# Patient Record
Sex: MALE | Race: WHITE | NOT HISPANIC OR LATINO | URBAN - METROPOLITAN AREA
[De-identification: names, ages, dates, MRNs, and addresses within clinical notes are randomized per-mention and may not be internally consistent; named-entity substitution may affect disease eponyms.]

---

## 2018-12-14 ENCOUNTER — EMERGENCY (EMERGENCY)
Facility: HOSPITAL | Age: 38
LOS: 1 days | Discharge: HOME | End: 2018-12-14
Attending: EMERGENCY MEDICINE | Admitting: EMERGENCY MEDICINE
Payer: OTHER MISCELLANEOUS

## 2018-12-14 VITALS
OXYGEN SATURATION: 100 % | HEART RATE: 79 BPM | DIASTOLIC BLOOD PRESSURE: 85 MMHG | TEMPERATURE: 98 F | RESPIRATION RATE: 18 BRPM | SYSTOLIC BLOOD PRESSURE: 149 MMHG

## 2018-12-14 LAB
ALBUMIN SERPL ELPH-MCNC: 4.4 G/DL — SIGNIFICANT CHANGE UP (ref 3.3–5)
ALP SERPL-CCNC: 54 U/L — SIGNIFICANT CHANGE UP (ref 40–120)
ALT FLD-CCNC: 34 U/L — SIGNIFICANT CHANGE UP (ref 4–41)
APPEARANCE UR: CLEAR — SIGNIFICANT CHANGE UP
APTT BLD: 31.9 SEC — SIGNIFICANT CHANGE UP (ref 27.5–36.3)
AST SERPL-CCNC: 30 U/L — SIGNIFICANT CHANGE UP (ref 4–40)
BASOPHILS # BLD AUTO: 0.03 K/UL — SIGNIFICANT CHANGE UP (ref 0–0.2)
BASOPHILS NFR BLD AUTO: 0.6 % — SIGNIFICANT CHANGE UP (ref 0–2)
BILIRUB SERPL-MCNC: 0.3 MG/DL — SIGNIFICANT CHANGE UP (ref 0.2–1.2)
BILIRUB UR-MCNC: NEGATIVE — SIGNIFICANT CHANGE UP
BLD GP AB SCN SERPL QL: NEGATIVE — SIGNIFICANT CHANGE UP
BLOOD UR QL VISUAL: NEGATIVE — SIGNIFICANT CHANGE UP
BUN SERPL-MCNC: 13 MG/DL — SIGNIFICANT CHANGE UP (ref 7–23)
CALCIUM SERPL-MCNC: 9.4 MG/DL — SIGNIFICANT CHANGE UP (ref 8.4–10.5)
CHLORIDE SERPL-SCNC: 100 MMOL/L — SIGNIFICANT CHANGE UP (ref 98–107)
CO2 SERPL-SCNC: 25 MMOL/L — SIGNIFICANT CHANGE UP (ref 22–31)
COLOR SPEC: SIGNIFICANT CHANGE UP
CREAT SERPL-MCNC: 0.85 MG/DL — SIGNIFICANT CHANGE UP (ref 0.5–1.3)
EOSINOPHIL # BLD AUTO: 0.16 K/UL — SIGNIFICANT CHANGE UP (ref 0–0.5)
EOSINOPHIL NFR BLD AUTO: 3.1 % — SIGNIFICANT CHANGE UP (ref 0–6)
GLUCOSE SERPL-MCNC: 134 MG/DL — HIGH (ref 70–99)
GLUCOSE UR-MCNC: NEGATIVE — SIGNIFICANT CHANGE UP
HBA1C BLD-MCNC: 5.6 % — SIGNIFICANT CHANGE UP (ref 4–5.6)
HCT VFR BLD CALC: 43.6 % — SIGNIFICANT CHANGE UP (ref 39–50)
HGB BLD-MCNC: 14.6 G/DL — SIGNIFICANT CHANGE UP (ref 13–17)
IMM GRANULOCYTES # BLD AUTO: 0.02 # — SIGNIFICANT CHANGE UP
IMM GRANULOCYTES NFR BLD AUTO: 0.4 % — SIGNIFICANT CHANGE UP (ref 0–1.5)
INR BLD: 0.82 — LOW (ref 0.88–1.17)
KETONES UR-MCNC: NEGATIVE — SIGNIFICANT CHANGE UP
LEUKOCYTE ESTERASE UR-ACNC: NEGATIVE — SIGNIFICANT CHANGE UP
LYMPHOCYTES # BLD AUTO: 1.4 K/UL — SIGNIFICANT CHANGE UP (ref 1–3.3)
LYMPHOCYTES # BLD AUTO: 26.9 % — SIGNIFICANT CHANGE UP (ref 13–44)
MCHC RBC-ENTMCNC: 29.6 PG — SIGNIFICANT CHANGE UP (ref 27–34)
MCHC RBC-ENTMCNC: 33.5 % — SIGNIFICANT CHANGE UP (ref 32–36)
MCV RBC AUTO: 88.3 FL — SIGNIFICANT CHANGE UP (ref 80–100)
MONOCYTES # BLD AUTO: 0.54 K/UL — SIGNIFICANT CHANGE UP (ref 0–0.9)
MONOCYTES NFR BLD AUTO: 10.4 % — SIGNIFICANT CHANGE UP (ref 2–14)
NEUTROPHILS # BLD AUTO: 3.06 K/UL — SIGNIFICANT CHANGE UP (ref 1.8–7.4)
NEUTROPHILS NFR BLD AUTO: 58.6 % — SIGNIFICANT CHANGE UP (ref 43–77)
NITRITE UR-MCNC: NEGATIVE — SIGNIFICANT CHANGE UP
NRBC # FLD: 0 — SIGNIFICANT CHANGE UP
PH UR: 6 — SIGNIFICANT CHANGE UP (ref 5–8)
PLATELET # BLD AUTO: 261 K/UL — SIGNIFICANT CHANGE UP (ref 150–400)
PMV BLD: 9.5 FL — SIGNIFICANT CHANGE UP (ref 7–13)
POTASSIUM SERPL-MCNC: 4.3 MMOL/L — SIGNIFICANT CHANGE UP (ref 3.5–5.3)
POTASSIUM SERPL-SCNC: 4.3 MMOL/L — SIGNIFICANT CHANGE UP (ref 3.5–5.3)
PROT SERPL-MCNC: 7.5 G/DL — SIGNIFICANT CHANGE UP (ref 6–8.3)
PROT UR-MCNC: NEGATIVE — SIGNIFICANT CHANGE UP
PROTHROM AB SERPL-ACNC: 9.3 SEC — LOW (ref 9.8–13.1)
RBC # BLD: 4.94 M/UL — SIGNIFICANT CHANGE UP (ref 4.2–5.8)
RBC # FLD: 12.4 % — SIGNIFICANT CHANGE UP (ref 10.3–14.5)
RH IG SCN BLD-IMP: POSITIVE — SIGNIFICANT CHANGE UP
SODIUM SERPL-SCNC: 139 MMOL/L — SIGNIFICANT CHANGE UP (ref 135–145)
SP GR SPEC: 1.03 — SIGNIFICANT CHANGE UP (ref 1–1.04)
UROBILINOGEN FLD QL: NORMAL — SIGNIFICANT CHANGE UP
WBC # BLD: 5.21 K/UL — SIGNIFICANT CHANGE UP (ref 3.8–10.5)
WBC # FLD AUTO: 5.21 K/UL — SIGNIFICANT CHANGE UP (ref 3.8–10.5)

## 2018-12-14 PROCEDURE — 74177 CT ABD & PELVIS W/CONTRAST: CPT | Mod: 26

## 2018-12-14 PROCEDURE — 76870 US EXAM SCROTUM: CPT | Mod: 26

## 2018-12-14 PROCEDURE — 99218: CPT

## 2018-12-14 RX ORDER — HYDROMORPHONE HYDROCHLORIDE 2 MG/ML
1 INJECTION INTRAMUSCULAR; INTRAVENOUS; SUBCUTANEOUS ONCE
Qty: 0 | Refills: 0 | Status: DISCONTINUED | OUTPATIENT
Start: 2018-12-14 | End: 2018-12-14

## 2018-12-14 RX ORDER — FENTANYL CITRATE 50 UG/ML
50 INJECTION INTRAVENOUS ONCE
Qty: 0 | Refills: 0 | Status: DISCONTINUED | OUTPATIENT
Start: 2018-12-14 | End: 2018-12-14

## 2018-12-14 RX ORDER — SODIUM CHLORIDE 9 MG/ML
1000 INJECTION INTRAMUSCULAR; INTRAVENOUS; SUBCUTANEOUS ONCE
Qty: 0 | Refills: 0 | Status: COMPLETED | OUTPATIENT
Start: 2018-12-14 | End: 2018-12-14

## 2018-12-14 RX ORDER — SODIUM CHLORIDE 9 MG/ML
1000 INJECTION INTRAMUSCULAR; INTRAVENOUS; SUBCUTANEOUS
Qty: 0 | Refills: 0 | Status: DISCONTINUED | OUTPATIENT
Start: 2018-12-14 | End: 2018-12-18

## 2018-12-14 RX ORDER — ONDANSETRON 8 MG/1
4 TABLET, FILM COATED ORAL ONCE
Qty: 0 | Refills: 0 | Status: COMPLETED | OUTPATIENT
Start: 2018-12-14 | End: 2018-12-14

## 2018-12-14 RX ORDER — MORPHINE SULFATE 50 MG/1
4 CAPSULE, EXTENDED RELEASE ORAL ONCE
Qty: 0 | Refills: 0 | Status: DISCONTINUED | OUTPATIENT
Start: 2018-12-14 | End: 2018-12-14

## 2018-12-14 RX ORDER — DIPHENHYDRAMINE HCL 50 MG
25 CAPSULE ORAL ONCE
Qty: 0 | Refills: 0 | Status: COMPLETED | OUTPATIENT
Start: 2018-12-14 | End: 2018-12-14

## 2018-12-14 RX ADMIN — Medication 25 MILLIGRAM(S): at 15:29

## 2018-12-14 RX ADMIN — FENTANYL CITRATE 50 MICROGRAM(S): 50 INJECTION INTRAVENOUS at 12:00

## 2018-12-14 RX ADMIN — SODIUM CHLORIDE 1000 MILLILITER(S): 9 INJECTION INTRAMUSCULAR; INTRAVENOUS; SUBCUTANEOUS at 17:00

## 2018-12-14 RX ADMIN — FENTANYL CITRATE 50 MICROGRAM(S): 50 INJECTION INTRAVENOUS at 18:34

## 2018-12-14 RX ADMIN — MORPHINE SULFATE 4 MILLIGRAM(S): 50 CAPSULE, EXTENDED RELEASE ORAL at 10:10

## 2018-12-14 RX ADMIN — FENTANYL CITRATE 50 MICROGRAM(S): 50 INJECTION INTRAVENOUS at 18:16

## 2018-12-14 RX ADMIN — HYDROMORPHONE HYDROCHLORIDE 1 MILLIGRAM(S): 2 INJECTION INTRAMUSCULAR; INTRAVENOUS; SUBCUTANEOUS at 22:55

## 2018-12-14 RX ADMIN — MORPHINE SULFATE 4 MILLIGRAM(S): 50 CAPSULE, EXTENDED RELEASE ORAL at 09:38

## 2018-12-14 RX ADMIN — MORPHINE SULFATE 4 MILLIGRAM(S): 50 CAPSULE, EXTENDED RELEASE ORAL at 13:15

## 2018-12-14 RX ADMIN — MORPHINE SULFATE 4 MILLIGRAM(S): 50 CAPSULE, EXTENDED RELEASE ORAL at 10:06

## 2018-12-14 RX ADMIN — ONDANSETRON 4 MILLIGRAM(S): 8 TABLET, FILM COATED ORAL at 09:39

## 2018-12-14 RX ADMIN — MORPHINE SULFATE 4 MILLIGRAM(S): 50 CAPSULE, EXTENDED RELEASE ORAL at 13:00

## 2018-12-14 RX ADMIN — SODIUM CHLORIDE 1000 MILLILITER(S): 9 INJECTION INTRAMUSCULAR; INTRAVENOUS; SUBCUTANEOUS at 16:06

## 2018-12-14 RX ADMIN — HYDROMORPHONE HYDROCHLORIDE 1 MILLIGRAM(S): 2 INJECTION INTRAMUSCULAR; INTRAVENOUS; SUBCUTANEOUS at 22:19

## 2018-12-14 RX ADMIN — SODIUM CHLORIDE 3000 MILLILITER(S): 9 INJECTION INTRAMUSCULAR; INTRAVENOUS; SUBCUTANEOUS at 14:04

## 2018-12-14 RX ADMIN — SODIUM CHLORIDE 1000 MILLILITER(S): 9 INJECTION INTRAMUSCULAR; INTRAVENOUS; SUBCUTANEOUS at 17:04

## 2018-12-14 RX ADMIN — FENTANYL CITRATE 50 MICROGRAM(S): 50 INJECTION INTRAVENOUS at 14:59

## 2018-12-14 RX ADMIN — SODIUM CHLORIDE 1000 MILLILITER(S): 9 INJECTION INTRAMUSCULAR; INTRAVENOUS; SUBCUTANEOUS at 20:05

## 2018-12-14 RX ADMIN — SODIUM CHLORIDE 100 MILLILITER(S): 9 INJECTION INTRAMUSCULAR; INTRAVENOUS; SUBCUTANEOUS at 22:21

## 2018-12-14 RX ADMIN — MORPHINE SULFATE 4 MILLIGRAM(S): 50 CAPSULE, EXTENDED RELEASE ORAL at 11:45

## 2018-12-14 RX ADMIN — FENTANYL CITRATE 50 MICROGRAM(S): 50 INJECTION INTRAVENOUS at 11:45

## 2018-12-14 RX ADMIN — SODIUM CHLORIDE 1000 MILLILITER(S): 9 INJECTION INTRAMUSCULAR; INTRAVENOUS; SUBCUTANEOUS at 11:45

## 2018-12-14 RX ADMIN — SODIUM CHLORIDE 1000 MILLILITER(S): 9 INJECTION INTRAMUSCULAR; INTRAVENOUS; SUBCUTANEOUS at 09:39

## 2018-12-14 RX ADMIN — SODIUM CHLORIDE 1000 MILLILITER(S): 9 INJECTION INTRAMUSCULAR; INTRAVENOUS; SUBCUTANEOUS at 18:34

## 2018-12-14 NOTE — ED CDU PROVIDER INITIAL DAY NOTE - ATTENDING CONTRIBUTION TO CARE
Carly: 37 yo male with no significant medical history s/p mechanical fall through a floor an straddle injury on a beam below. No head trauma or LOC. + lower abdominal pain and nausea but no vomiting.  No chest pain or SOB. Pt endorses difficulty urinating despite having the urge. No flank pain. NO neck or back pain. No LE weakness or paresthesias. NO chronic anticoagulation use. Exam: GENERAL: well appearing, NAD, HEENT: MMM, PERRLA, CARDIO: +S1/S2, no murmurs, rubs or gallops, LUNGS: CTA B/L, no wheezing, rales or rhonchi, ABD: soft, mild suprapubic TTP, BSx4 quadrants, no guarding or rigidity. : exam chaperoned by DR Feliciano- extensive b/l scrotal edema and ecchymosis with TTP, EXT: No LE edema 5/5 strength x 4 extremities MSK: no midline spinal TTP NEURO: AxOx3, SKIN: no rashes or lesions, well perfused A/P - 37 yo male s/p isolated straddle injury. CTAP showed possible sacroiliitis and evidence of Crohn's- results discussed with patient. scrotal US showed possible decreased flow to left testicle- urology disagreed. Pt being followed by urology, was able to spontaneously void and CDU plan for monitoring of post void residual urine and pain control.

## 2018-12-14 NOTE — ED ADULT NURSE NOTE - NSIMPLEMENTINTERV_GEN_ALL_ED
Implemented All Universal Safety Interventions:  [place] to call system. Call bell, personal items and telephone within reach. Instruct patient to call for assistance. Room bathroom lighting operational. Non-slip footwear when patient is off stretcher. Physically safe environment: no spills, clutter or unnecessary equipment. Stretcher in lowest position, wheels locked, appropriate side rails in place.

## 2018-12-14 NOTE — ED PROVIDER NOTE - SHIFT CHANGE DETAILS
I have signed over this patient to the above attending physician. Pertinent history, physical exam findings and workup thus far in the ED have been discussed. The pending tests and plan, including urology recommendations were signed over.  All questions from the above attending physician have been answered.

## 2018-12-14 NOTE — ED ADULT NURSE NOTE - OBJECTIVE STATEMENT
Pt received in rm 8, AOx3, ambulatory, c/o of a fall 8 feet from 1st floor to basement, landing on groin area Pt received in rm 8, AOx3, ambulatory, c/o of a fall 8 feet from 1st floor to basement this morning at work, landing on groin area. Pt denies any LOC, head, neck, back pain, N/V or abd pain. Pt denies being on any anticoagulations. Pmhx HLD. Pt reports throbbing 9/10 pain and describes left testicle as feeling different than baseline. MD at bedside for eval, scrotum area tender on palpation with areas of ecchymosis. Pt reports not being able to urinate since fall. IV access obtained 20G Lft hand, labs sent, patient medicated for pain as ordered. Pt waiting on ultrasound, CT and urology consult, will continue to monitor. Pt received in rm 8, AOx3, ambulatory, c/o of a fall 8 feet from 1st floor to basement this morning at work, landing on groin area. Pt denies any LOC, head, neck, back pain, nausea or vomiting. Pt denies being on any anticoagulations. Pmhx HLD. Pt reports throbbing 9/10 groin and abd pain and describes left testicle as feeling different than baseline. MD at bedside for eval, scrotum area tender on palpation with areas of ecchymosis. Pt reports not being able to urinate since fall. IV access obtained 20G Lft hand, labs sent, patient medicated for pain as ordered. Pt waiting on ultrasound, CT and urology consult, will continue to monitor. Pt received in rm 8, AOx3, ambulatory, c/o of testicular pain after a 8 feet fall from 1st floor to basement this morning at work, landing straddling on beam. Pt denies any LOC, head, neck, or back pain. Pt denies being on any anticoagulations. Pt reports nausea with no vomiting and throbbing 9/10 groin/abd pain. Pt describes left testicle as feeling different than baseline. MD at bedside for eval, scrotum area tender on palpation with areas of ecchymosis. Pt reports not being able to urinate since fall. IV access obtained 20G Lft hand, labs sent, patient medicated for pain as ordered. Pt waiting on ultrasound, CT and urology consult, will continue to monitor. Pt received in rm 8, AOx3, ambulatory, c/o of testicular pain after a 8 feet fall from 1st floor to basement this morning at work, landing straddling on beam. Pt denies any LOC, head, neck, or back pain. Pt denies being on any anticoagulations. Pt reports nausea with no vomiting and throbbing 9/10 groin/abd pain. Pt describes left testicle as feeling different than baseline. MD at bedside for eval, scrotum area tender on palpation with areas of ecchymosis. Pt reports not being able to urinate since fall. IV access obtained 20G Rt hand, labs sent, patient medicated for pain as ordered. Pt waiting on ultrasound, CT and urology consult, will continue to monitor.

## 2018-12-14 NOTE — ED PROVIDER NOTE - PROGRESS NOTE DETAILS
Carly: Pt still unable to urinate, reassessed by urology, recommending additional IVF to stimulate urination. will continue to reassess. Carly: Pt s/p 2 liters IVf and still unable to urinate, discussed with urology- only 200cc in bladder on ultrasound. will give additional IVF and discuss with CDU for observation for pain control. Rafiq Bravo PGY1  Patient had 3L NS and tolerated large PO of water. Had 50cc of spontaneous void. Post residual void showed 400cc. Urology stated will place waters cath. Still has pain, treating with IV pain control. CDU agreed to observe for urine output monitoring and pain control. Patient agrees with plan. RAFFI KO MD: S/O from Dr. Carroll.  Pending urology repeats consult to determine if catheter will be placed by them ([facility]) and admit vs CDU for I's/O's and pain control.

## 2018-12-14 NOTE — ED CDU PROVIDER INITIAL DAY NOTE - GENITOURINARY [+], MLM
testicular pain and inability to urinate PELVIC PAIN/DIFFICULTY URINATING/testicular pain and inability to urinate

## 2018-12-14 NOTE — ED ADULT NURSE REASSESSMENT NOTE - NS ED NURSE REASSESS COMMENT FT1
Pt AOx3, denies any pain, IV fluids stopped, pt will be monitored as CDU border for urine output, report given to CDU nurse.

## 2018-12-14 NOTE — CONSULT NOTE ADULT - ASSESSMENT
Assessment & Plan  38M with straddle injury, no testicular injury noted on exam or scrotal US, pt has not voided since injury, though on bladder sono 100cc seen and pt not particularly uncomfortable.      - Give pt time to void, as it would be best to avoid catheter placement  - If patient develops retention, UROLOGY will attempt to place a 16F straight silicon catheter.  Please do not have any other provider attempt that is not urology  - NPO in case pt needs operative management  - If urology unable to place [facility], will need a Retrograde Urethrogram    - Discussed with Dr. Jacinto Feldman MD  Urology Resident  Pager #[phone]

## 2018-12-14 NOTE — ED CDU PROVIDER INITIAL DAY NOTE - OBJECTIVE STATEMENT
39 y/o M with no pertinent medical history presents with complaint of testicular pain after straddle injury at work site. Also complaining of lower abdominal pain and was unable to urinate. Did not hit his head and denies LOC. Some nausea but no vomiting. Denies chest pain, SOB, headache, neck pain, focal deficits. States that his left testicle feels mushy. No blood coming from penis.    CDU GRICELDA Santos Note------  39 yo male, stated PMH of hyperlipidemia (does not take meds) and chronic upper back pain (due to work related injury in the remote past), and no stated PSH, presented to the ED s/p injury at work where pt fell from an ~8 foot height onto a pipe - describes straddle-type mechanism to fall; pt was c/o testicular pain and difficulty urinating as per above.  Pt. was evaluated in the ED; CT scan was negative for acute pathology; UA negative for blood.  Urology was consulted to eval for urinary retention; pt was dispo'd to CDU for continued monitoring of I&O's and urology team periodic reassessments, as well as pain control and generalized observation care / monitoring.  On CDU evaluation, pt c/o generalized pain to  region, states same location/type/quality of pain as that he initially presented to the ED with; verbalizes relief was rec'd with analgesia being given periodically during ED visit. 39 y/o M with no pertinent medical history presents with complaint of testicular pain after straddle injury at work site. Also complaining of lower abdominal pain and was unable to urinate. Did not hit his head and denies LOC. Some nausea but no vomiting. Denies chest pain, SOB, headache, neck pain, focal deficits. States that his left testicle feels mushy. No blood coming from penis.    CDU GRICELDA Santos Note------  37 yo male, stated PMH of hyperlipidemia (does not take meds) and chronic upper back pain (due to work related injury in the remote past), and no stated PSH, presented to the ED s/p injury at work where pt fell from an ~8 foot height onto a pipe - describes straddle-type mechanism to fall; pt was c/o testicular pain and difficulty urinating as per above.  Pt. was evaluated in the ED; CT scan was negative for acute pathology; UA negative for blood.  Urology was consulted to eval for urinary retention; pt was dispo'd to CDU for continued monitoring of I&O's and urology team periodic reassessments, as well as pain control and generalized observation care / monitoring.  On CDU evaluation, pt c/o generalized pain to  region, states same location/type/quality of pain as that he initially presented to the ED with; verbalizes relief was rec'd with analgesia being given periodically during ED visit.  No other c/o or other injury.  Pt. denies chest pain/discomfort, SOB, abdominal pain, back pain, extremity pain.  CDU plan discussed with pt who verbalizes agreement with plan.

## 2018-12-14 NOTE — ED PROVIDER NOTE - ATTENDING CONTRIBUTION TO CARE
Carly: 37 yo male with no significant medical history s/p mechanical fall through a floor an straddle injury on a beam below. No head trauma or LOC. + lower abdominal pain and nausea but no vomiting.  No chest pain or SOB. Pt endorses difficulty urinating despite having the urge. No flank pain. NO neck or back pain. No LE weakness or paresthesias. NO chronic anticoagulation use. Exam: GENERAL: well appearing, NAD, HEENT: MMM, PERRLA, CARDIO: +S1/S2, no murmurs, rubs or gallops, LUNGS: CTA B/L, no wheezing, rales or rhonchi, ABD: soft, mild suprapubic TTP, BSx4 quadrants, no guarding or rigidity. : exam chaperoned by DR Feliciano- extensive b/l scrotal edema and ecchymosis with TTP, EXT: No LE edema 5/5 strength x 4 extremities MSK: no midline spinal TTP NEURO: AxOx3, SKIN: no rashes or lesions, well perfused A/P - 37 yo male s/p isolated straddle injury. will obtain cbc, cmp, coags, type and screen, scrotal US, CTAP and urology consult. will give pain control, voiding trial and reassess.

## 2018-12-14 NOTE — CONSULT NOTE ADULT - SUBJECTIVE AND OBJECTIVE BOX
CC: 38y old Male admitted with a chief complaint of fall w/ straddle injury    HPI: 38M with no PMHx presents s/p fall through planks and landed straddling a metal pipe.  Pt reports that he fell approximately 6 feet.  He denies LOC, or head injury.  He reports immediate perineal/scrotal pain.  He was concerned that he injured his L testicle as it felt "broken".  Since the incident, he reports that he has been unable to void though feels the urge.  he denies any episodes of incontinence or hematuria after the episode.  He also notes that his R hemiscrotum feels "numb".      PMHx: No pertinent past medical history    PSHx: No significant past surgical history    Medications (inpatient):   Medications (PRN):  Allergies: aspirin (Other)  ibuprofen (Other)  (Intolerances: )  Social Hx:     Physical Exam  T(C): 36.8 (12-14-18 @ 17:10)  HR: 84 (12-14-18 @ 17:10) (79 - 92)  BP: 134/89 (12-14-18 @ 17:10) (134/89 - 177/92)  RR: 18 (12-14-18 @ 17:10) (18 - 18)  SpO2: 100% (12-14-18 @ 17:10) (100% - 100%)  Wt(kg): --  Tmax: T(C): , Max: 36.8 (12-14-18 @ 17:10)  Wt(kg): --    General: well developed, well nourished, NAD  Neuro: alert and oriented, no focal deficits, moves all extremities spontaneously, normal anal tone  HEENT: NCAT, EOMI, anicteric, mucosa moist  Respiratory: airway patent, respirations unlabored  Abdomen: soft, mildly tender to palpation in the suprapubic area, nondistended  : circumcised penis, R testicle normal in contour, no masses palpated, decreased sensation; mild ecchymosis seen on L hemiscrotum, significant discomfort on exam of L testicle, L testicle is normal in contour, no palpable hematoma, b/l vas palpated, apex of prostate palpated on exam though limited due to pt body habitus.    Extremities: no edema, sensation and movement grossly intact  Skin: warm, dry, appropriate color    Labs:                        14.6   5.21  )-----------( 261      ( 14 Dec 2018 10:00 )             43.6     PT/INR - ( 14 Dec 2018 10:00 )   PT: 9.3 SEC;   INR: 0.82          PTT - ( 14 Dec 2018 10:00 )  PTT:31.9 SEC  12-14    139  |  100  |  13  ----------------------------<  134<H>  4.3   |  25  |  0.85    Ca    9.4      14 Dec 2018 10:00    TPro  7.5  /  Alb  4.4  /  TBili  0.3  /  DBili  x   /  AST  30  /  ALT  34  /  AlkPhos  54  12-14          Imaging and other studies:    < from: CT Abdomen and Pelvis w/ IV Cont (12.14.18 @ 10:50) >  FINDINGS:    LOWER CHEST: Within normal limits.    LIVER: Within normal limits.  BILE DUCTS: Normal caliber.  GALLBLADDER: Within normal limits.  SPLEEN: Within normal limits.  PANCREAS: Within normal limits.  ADRENALS: Within normal limits.  KIDNEYS/URETERS: Within normal limits.    BLADDER: Within normal limits.  REPRODUCTIVE ORGANS: The prostate is within normal limits.    BOWEL: Periampullary duodenal diverticulum. No bowel obstruction. Colonic diverticulosis. Submucosal fatty deposition in the descending and ascending colon suggests chronic inflammation. Appendix is normal.  PERITONEUM: No ascites.  VESSELS:  Within normal limits.  RETROPERITONEUM: No lymphadenopathy.    ABDOMINAL WALL: Within normal limits.  BONES: There is fusion of the right SI joint with sclerosis, irregularity of the left SI joint. Prior right rib fractures. No acute fracture.    IMPRESSION:   No acute abnormality in the abdomen and pelvis.  Asymmetric bilateral sacroiliitis with fusion of the right SI joint. On the background of chronic changes in the large bowel, these findings raise question of underlying inflammatory bowel disease like Crohn's.      < from: US Testicles (12.14.18 @ 12:17) >  FINDINGS:      RIGHT:  Right testis: 3.6 x 2.3 x 2.4 cm. Normal echogenicity and echotexture with no masses or areas of architectural distortion. Normal blood flow pattern.  Right epididymis: Within normal limits.  Right hydrocele: None.  Right varicocele: None.      LEFT:   Left testis: 3.1 x 2.3 x 2.5 cm. Normal echogenicity and echotexture with no masses or areas of architectural distortion. Mildly diminished blood flow pattern compared to the right.  Left epididymis: Subcentimeter cyst.  Left hydrocele: None.  Left varicocele: None.    IMPRESSION:   No evidence of traumatic injury.  Mildly diminished flow to the left testicle of uncertain etiology.

## 2018-12-14 NOTE — ED ADULT NURSE REASSESSMENT NOTE - NS ED NURSE REASSESS COMMENT FT1
Pt AOx3, medicated for pain as ordered, Pt reports still unable to urinate, Urology at bedside for consult.

## 2018-12-14 NOTE — ED CDU PROVIDER INITIAL DAY NOTE - GASTROINTESTINAL, MLM
Abdomen soft, protuberant (pt states contour is his baseline chronic contour), subjectively and objectively non-tender, no rebound, no guarding.  No suprapubic TTP or prominence.  No abdominal or pelvic skin bruising or abrasion noted.

## 2018-12-14 NOTE — ED CDU PROVIDER INITIAL DAY NOTE - PROGRESS NOTE DETAILS
Approx. 2315 hrs, pt rang call bell, having just voided bladder (pt had been advised to notify medical team each time he voids so frequency of urination as well as each void volume can be documented); per RN, voided volume was 390 milliliters.  I paged Urology to come reassess PVR (post void residual); PVR per Uro PA was 114 milliliters.  Will continue to monitor.  Pt. stable at present.

## 2018-12-14 NOTE — ED PROVIDER NOTE - MEDICAL DECISION MAKING DETAILS
37 y/o M with straddle injury with scrotal pain, difficulty urinating and lower abd pain. Concern for testicular and urethral injury. No blood at meatus. Will have CT a/p w/ IV contrast, Testicular US, Urology notified of patient

## 2018-12-14 NOTE — ED PROVIDER NOTE - PHYSICAL EXAMINATION
Gen:  alert, awake, no acute distress  HEENT:  atraumatic head, airway patent  CV:  rrr, nl S1, S2  Pulm:  lungs CTA b/l  Abd: tenderness in lower abdomen  : testicular swelling and erythema. unable to palpate left testicle. significant scrotal swelling  Ext:  moving all extremities  Neuro:  grossly intact, no focal deficits  Skin:  clear, dry, intact

## 2018-12-14 NOTE — ED ADULT NURSE NOTE - CHPI ED NUR SYMPTOMS NEG
no vomiting/no nausea/no chills/no tingling/no dizziness/no fever/no decreased eating/drinking/no weakness

## 2018-12-14 NOTE — ED ADULT TRIAGE NOTE - CHIEF COMPLAINT QUOTE
Pt states that he fell through a floor approx 8 ft, and landed straddling a pipe, c/o testicular pain.  Pt denies head strike, denies any other injuries. Injury occurred while at work approx 1 hr PTA.  Pt ambulatory. Pt denies PMH

## 2018-12-14 NOTE — ED CDU PROVIDER INITIAL DAY NOTE - GENITOURINARY BLADDER
non-tender/non-distended/Penis/scrotum appear WNL with no skin bruising or abrasion noted.  Urethral meatus without blood or discharge.

## 2018-12-14 NOTE — ED ADULT NURSE REASSESSMENT NOTE - NS ED NURSE REASSESS COMMENT FT1
Received report from marcie RN, Pt AOx3, unable to urinate, 3rd L NS hanging as ordered, will continue to monitor.

## 2018-12-14 NOTE — CHART NOTE - NSCHARTNOTEFT_GEN_A_CORE
Pt voided about 45 min ago - unmeasured   PVR performed = 150 cc  No need for waters at present  Will continue strict I& O's and measure one more PVR before clearing for D/C.

## 2018-12-14 NOTE — ED CDU PROVIDER INITIAL DAY NOTE - MEDICAL DECISION MAKING DETAILS
39 yo male with scrotal injury s/p mechanical fall. Pt being followed by urology. will monitor urine output since pt has had difficulty voiding. will continue pain control and observe.

## 2018-12-14 NOTE — ED ADULT NURSE REASSESSMENT NOTE - NS ED NURSE REASSESS COMMENT FT1
Pt urinated ~50cc of clear yellow urine. MD made aware. UA and urine culture sent as per MD orders. Awaiting UA result, then dispo. Pt appears frustrated and throwing sandwich on bed. Pt updated on plan of care. Pt verbalized understanding. Will continue to monitor.

## 2018-12-14 NOTE — ED PROVIDER NOTE - OBJECTIVE STATEMENT
37 y/o M with no pertinent medical history presents with complaint of testicular pain after straddle injury at work site. Also complaining of lower abdominal pain and was unable to urinate. Did not hit his head and denies LOC. Some nausea but no vomiting. Denies chest pain, SOB, headache, neck pain, focal deficits. States that his left testicle feels mushy. No blood coming from penis.

## 2018-12-14 NOTE — ED CDU PROVIDER INITIAL DAY NOTE - PMH
No pertinent past medical history Back pain due to injury  (chronic upper back pain due to stated work injury in the past)  Hyperlipidemia  (pt not on meds)

## 2018-12-15 VITALS
DIASTOLIC BLOOD PRESSURE: 92 MMHG | RESPIRATION RATE: 17 BRPM | TEMPERATURE: 98 F | SYSTOLIC BLOOD PRESSURE: 131 MMHG | HEART RATE: 72 BPM | OXYGEN SATURATION: 97 %

## 2018-12-15 PROCEDURE — 99217: CPT

## 2018-12-15 RX ORDER — HYDROMORPHONE HYDROCHLORIDE 2 MG/ML
1 INJECTION INTRAMUSCULAR; INTRAVENOUS; SUBCUTANEOUS ONCE
Qty: 0 | Refills: 0 | Status: DISCONTINUED | OUTPATIENT
Start: 2018-12-15 | End: 2018-12-15

## 2018-12-15 RX ORDER — ONDANSETRON 8 MG/1
4 TABLET, FILM COATED ORAL ONCE
Qty: 0 | Refills: 0 | Status: COMPLETED | OUTPATIENT
Start: 2018-12-15 | End: 2018-12-15

## 2018-12-15 RX ORDER — OXYCODONE HYDROCHLORIDE 5 MG/1
1 TABLET ORAL
Qty: 12 | Refills: 0 | OUTPATIENT
Start: 2018-12-15 | End: 2018-12-17

## 2018-12-15 RX ORDER — OXYCODONE AND ACETAMINOPHEN 5; 325 MG/1; MG/1
1 TABLET ORAL ONCE
Qty: 0 | Refills: 0 | Status: DISCONTINUED | OUTPATIENT
Start: 2018-12-15 | End: 2018-12-15

## 2018-12-15 RX ADMIN — ONDANSETRON 4 MILLIGRAM(S): 8 TABLET, FILM COATED ORAL at 02:56

## 2018-12-15 RX ADMIN — HYDROMORPHONE HYDROCHLORIDE 1 MILLIGRAM(S): 2 INJECTION INTRAMUSCULAR; INTRAVENOUS; SUBCUTANEOUS at 02:20

## 2018-12-15 RX ADMIN — OXYCODONE AND ACETAMINOPHEN 1 TABLET(S): 5; 325 TABLET ORAL at 09:44

## 2018-12-15 RX ADMIN — SODIUM CHLORIDE 100 MILLILITER(S): 9 INJECTION INTRAMUSCULAR; INTRAVENOUS; SUBCUTANEOUS at 05:56

## 2018-12-15 RX ADMIN — SODIUM CHLORIDE 1000 MILLILITER(S): 9 INJECTION INTRAMUSCULAR; INTRAVENOUS; SUBCUTANEOUS at 11:58

## 2018-12-15 RX ADMIN — HYDROMORPHONE HYDROCHLORIDE 1 MILLIGRAM(S): 2 INJECTION INTRAMUSCULAR; INTRAVENOUS; SUBCUTANEOUS at 03:00

## 2018-12-15 RX ADMIN — OXYCODONE AND ACETAMINOPHEN 1 TABLET(S): 5; 325 TABLET ORAL at 10:14

## 2018-12-15 NOTE — ED CDU PROVIDER DISPOSITION NOTE - CLINICAL COURSE
Carly: 39 yo male with no significant medical history s/p mechanical fall through a floor an straddle injury on a beam below. No head trauma or LOC. + lower abdominal pain and nausea but no vomiting.  No chest pain or SOB. Pt endorses difficulty urinating despite having the urge. No flank pain. NO neck or back pain. No LE weakness or paresthesias. NO chronic anticoagulation use. Exam: GENERAL: well appearing, NAD, HEENT: MMM, CARDIO: +S1/S2, no murmurs, rubs or gallops, LUNGS: CTA B/L, no wheezing, rales or rhonchi, ABD: soft, mild suprapubic TTP, BSx4 quadrants, no guarding or rigidity. :  b/l scrotal edema with TTP, NEURO: AxOx3, SKIN: no rashes or lesions, well perfused A/P - 39 yo male s/p isolated straddle injury. CTAP showed possible sacroiliitis and evidence of Crohn's- results discussed with patient. scrotal US showed possible decreased flow to left testicle- urology disagreed. Pt followed by urology and has been spontaneously voiding. Urology cleared pt. Pain adequately controlled on PO meds now. will d.c to f/u with urology as an outpatient.

## 2018-12-15 NOTE — ED CDU PROVIDER DISPOSITION NOTE - ATTENDING CONTRIBUTION TO CARE
Carly: 37 yo male with no significant medical history s/p mechanical fall through a floor an straddle injury on a beam below. No head trauma or LOC. + lower abdominal pain and nausea but no vomiting.  No chest pain or SOB. Pt endorses difficulty urinating despite having the urge. No flank pain. NO neck or back pain. No LE weakness or paresthesias. NO chronic anticoagulation use. Exam: GENERAL: well appearing, NAD, HEENT: MMM, CARDIO: +S1/S2, no murmurs, rubs or gallops, LUNGS: CTA B/L, no wheezing, rales or rhonchi, ABD: soft, mild suprapubic TTP, BSx4 quadrants, no guarding or rigidity. :  b/l scrotal edema with TTP, NEURO: AxOx3, SKIN: no rashes or lesions, well perfused A/P - 37 yo male s/p isolated straddle injury. CTAP showed possible sacroiliitis and evidence of Crohn's- results discussed with patient. scrotal US showed possible decreased flow to left testicle- urology disagreed. Pt followed by urology and has been spontaneously voiding. Urology cleared pt. Pain adequately controlled on PO meds now. will d.c to f/u with urology as an outpatient.

## 2018-12-15 NOTE — ED CDU PROVIDER SUBSEQUENT DAY NOTE - HISTORY
Pt is a 39 y/o M PMHx HLD p/w testicular pain s/p straddle injury on 12/14/18.  Pt noted severe pain to left testicle after falling a height of 8 feet and landing onto a pipe.  Pt denies any head trauma, LOC, numbness, weakness, back pain, inability to walk.  In ED, pt was found to have difficulty voiding.  Pt sent to CDU for pain control and monitoring to evaluate pt ability to void.  Pt has been able to void independently without difficulty over night.  Pt was just evaluated by urology a few hours ago, during which they found an acceptable PVR and a nonconcerning  exam.  Pt requesting discharge at this time. Pt notes pain well controlled with percocet.

## 2018-12-15 NOTE — ED CDU PROVIDER SUBSEQUENT DAY NOTE - PMH
Back pain due to injury  (chronic upper back pain due to stated work injury in the past)  Hyperlipidemia  (pt not on meds)

## 2018-12-15 NOTE — PROGRESS NOTE ADULT - ASSESSMENT
38yoM with straddle injury, CT and US negative     -Pain controlled  -Voiding well, clear yellow urine  -Okay for dc from urology standpoint  -Follow up next week with Dr. Casey  [facility] [phone]

## 2018-12-15 NOTE — ED CDU PROVIDER DISPOSITION NOTE - NSFOLLOWUPINSTRUCTIONS_ED_ALL_ED_FT
Advance activity as tolerated.  Continue all previously prescribed medications as directed unless otherwise instructed.  Take Percocet 325/5 once every 6 hours as needed for severe pain -- causes drowsiness; DO NOT drink alcohol, drive, or operate heavy machinery with this medication.  Caution federal law prohibits the transfer of this drug to any person other  than the person for whom it was prescribed. May cause drowsiness.  Alcohol may intensify this effect.  Use care when operating dangerous machinery.  This prescription cannot be refilled. Using more of this medication than prescribed may cause serious breathing problems  Follow up with your primary care physician, gastroenterology (referral list provided) and Dr. Casey (call [phone] to make an appointment) in 48-72 hours- bring copies of your results.  Return to the ER for worsening or persistent symptoms, including but not limited to worsening/persistent pain, difficulty urinating, and/or ANY NEW OR CONCERNING SYMPTOMS. If you have issues obtaining follow up, please call: [phone] to obtain a doctor or specialist who takes your insurance in your area.  You may call [phone] to make an appointment with the internal medicine clinic.

## 2018-12-15 NOTE — ED CDU PROVIDER SUBSEQUENT DAY NOTE - PROGRESS NOTE DETAILS
Pt stable in the interim; objectively comfortable-appearing at present.  Pt just voided approx 600 mL; uro contacted to check PVR; pending uro reassessment at this time.  Will continue to monitor.  Pt will be signed out to the day CDU PA (Jordan) and attending (Dr. Carroll) at 0700 hrs. Pt requesting to go home.  Pt aware of CT findings.  Pt agrees to follow up with PMD, GI and urology (referral list provided).  Strict return precautions given.

## 2018-12-15 NOTE — ED CDU PROVIDER SUBSEQUENT DAY NOTE - ATTENDING CONTRIBUTION TO CARE
Carly: 37 yo male with no significant medical history s/p mechanical fall through a floor an straddle injury on a beam below. No head trauma or LOC. + lower abdominal pain and nausea but no vomiting.  No chest pain or SOB. Pt endorses difficulty urinating despite having the urge. No flank pain. NO neck or back pain. No LE weakness or paresthesias. NO chronic anticoagulation use. Exam: GENERAL: well appearing, NAD, HEENT: MMM, PERRLA, CARDIO: +S1/S2, no murmurs, rubs or gallops, LUNGS: CTA B/L, no wheezing, rales or rhonchi, ABD: soft, mild suprapubic TTP, BSx4 quadrants, no guarding or rigidity. : exam chaperoned by DR Feliciano- extensive b/l scrotal edema and ecchymosis with TTP, EXT: No LE edema 5/5 strength x 4 extremities MSK: no midline spinal TTP NEURO: AxOx3, SKIN: no rashes or lesions, well perfused A/P - 37 yo male s/p isolated straddle injury. CTAP showed possible sacroiliitis and evidence of Crohn's- results discussed with patient. scrotal US showed possible decreased flow to left testicle- urology disagreed. Pt being followed by urology, was able to spontaneously void and CDU plan for monitoring of post void residual urine and pain control. Pt has been urinating spontaneously abd pain well controlled with IV medications. will transition to PO pain medication, discuss with urology for additional recommendations and likely d/c later today. Carly: 37 yo male with no significant medical history s/p mechanical fall through a floor an straddle injury on a beam below. No head trauma or LOC. + lower abdominal pain and nausea but no vomiting.  No chest pain or SOB. Pt endorses difficulty urinating despite having the urge. No flank pain. NO neck or back pain. No LE weakness or paresthesias. NO chronic anticoagulation use. Exam: GENERAL: well appearing, NAD, HEENT: MMM, CARDIO: +S1/S2, no murmurs, rubs or gallops, LUNGS: CTA B/L, no wheezing, rales or rhonchi, ABD: soft, mild suprapubic TTP, BSx4 quadrants, no guarding or rigidity. :  b/l scrotal edema with TTP, NEURO: AxOx3, SKIN: no rashes or lesions, well perfused A/P - 37 yo male s/p isolated straddle injury. CTAP showed possible sacroiliitis and evidence of Crohn's- results discussed with patient. scrotal US showed possible decreased flow to left testicle- urology disagreed. Pt followed by urology and has been spontaneously voiding. Urology cleared pt. Pain adequately controlled on PO meds now. will d.c to f/u with urology as an outpatient.

## 2018-12-15 NOTE — PROGRESS NOTE ADULT - SUBJECTIVE AND OBJECTIVE BOX
Patient feeling well.  PVRs .  Patient voided 600 1 hour after having PVR of 150.  Pain controlled.    T(F): 97.8, Max: 98.2 (12-14-18 @ 17:10)  HR: 75  BP: 140/90  SpO2: 98%    OUT:    Voided: 990 mL  Total OUT: 990 mL    Gen NAD  Abd Soft, NT   No butterfly hematoma, appropriately tender, L testicle more tender than R    12-14 @ 10:00  WBC 5.21  / Hct 43.6  / SCr 0.85

## 2018-12-15 NOTE — ED CDU PROVIDER SUBSEQUENT DAY NOTE - MEDICAL DECISION MAKING DETAILS
Pt is a 37 y/o M PMHx HLD p/w testicular pain s/p straddle injury on 12/14/18.  -- testicular pain, difficulty urinating -- appropriate for discharge at this time

## 2018-12-16 LAB
BACTERIA UR CULT: SIGNIFICANT CHANGE UP
SPECIMEN SOURCE: SIGNIFICANT CHANGE UP

## 2019-03-28 ENCOUNTER — EMERGENCY (EMERGENCY)
Facility: HOSPITAL | Age: 39
LOS: 1 days | Discharge: HOME | End: 2019-03-28
Attending: EMERGENCY MEDICINE | Admitting: EMERGENCY MEDICINE
Payer: SELF-PAY

## 2019-03-28 VITALS
OXYGEN SATURATION: 96 % | SYSTOLIC BLOOD PRESSURE: 145 MMHG | TEMPERATURE: 98 F | RESPIRATION RATE: 15 BRPM | DIASTOLIC BLOOD PRESSURE: 68 MMHG | HEART RATE: 99 BPM

## 2019-03-28 VITALS
DIASTOLIC BLOOD PRESSURE: 102 MMHG | SYSTOLIC BLOOD PRESSURE: 164 MMHG | RESPIRATION RATE: 14 BRPM | WEIGHT: 195.11 LBS | OXYGEN SATURATION: 96 % | TEMPERATURE: 97 F | HEART RATE: 111 BPM

## 2019-03-28 LAB
ALBUMIN SERPL ELPH-MCNC: 3.7 G/DL — SIGNIFICANT CHANGE UP (ref 3.3–5)
ALP SERPL-CCNC: 54 U/L — SIGNIFICANT CHANGE UP (ref 40–120)
ALT FLD-CCNC: 30 U/L — SIGNIFICANT CHANGE UP (ref 12–78)
ANION GAP SERPL CALC-SCNC: 11 MMOL/L — SIGNIFICANT CHANGE UP (ref 5–17)
APTT BLD: 31.4 SEC — SIGNIFICANT CHANGE UP (ref 27.5–36.3)
AST SERPL-CCNC: 18 U/L — SIGNIFICANT CHANGE UP (ref 15–37)
BASOPHILS # BLD AUTO: 0.02 K/UL — SIGNIFICANT CHANGE UP (ref 0–0.2)
BASOPHILS NFR BLD AUTO: 0.3 % — SIGNIFICANT CHANGE UP (ref 0–2)
BILIRUB SERPL-MCNC: 0.8 MG/DL — SIGNIFICANT CHANGE UP (ref 0.2–1.2)
BUN SERPL-MCNC: 13 MG/DL — SIGNIFICANT CHANGE UP (ref 7–23)
CALCIUM SERPL-MCNC: 9.2 MG/DL — SIGNIFICANT CHANGE UP (ref 8.5–10.1)
CHLORIDE SERPL-SCNC: 106 MMOL/L — SIGNIFICANT CHANGE UP (ref 96–108)
CO2 SERPL-SCNC: 23 MMOL/L — SIGNIFICANT CHANGE UP (ref 22–31)
CREAT SERPL-MCNC: 0.83 MG/DL — SIGNIFICANT CHANGE UP (ref 0.5–1.3)
EOSINOPHIL # BLD AUTO: 0.08 K/UL — SIGNIFICANT CHANGE UP (ref 0–0.5)
EOSINOPHIL NFR BLD AUTO: 1.3 % — SIGNIFICANT CHANGE UP (ref 0–6)
GLUCOSE SERPL-MCNC: 153 MG/DL — HIGH (ref 70–99)
HCT VFR BLD CALC: 43.1 % — SIGNIFICANT CHANGE UP (ref 39–50)
HGB BLD-MCNC: 14.9 G/DL — SIGNIFICANT CHANGE UP (ref 13–17)
IMM GRANULOCYTES NFR BLD AUTO: 0.3 % — SIGNIFICANT CHANGE UP (ref 0–1.5)
INR BLD: 0.94 RATIO — SIGNIFICANT CHANGE UP (ref 0.88–1.16)
LYMPHOCYTES # BLD AUTO: 1 K/UL — SIGNIFICANT CHANGE UP (ref 1–3.3)
LYMPHOCYTES # BLD AUTO: 16.8 % — SIGNIFICANT CHANGE UP (ref 13–44)
MCHC RBC-ENTMCNC: 30.2 PG — SIGNIFICANT CHANGE UP (ref 27–34)
MCHC RBC-ENTMCNC: 34.6 GM/DL — SIGNIFICANT CHANGE UP (ref 32–36)
MCV RBC AUTO: 87.4 FL — SIGNIFICANT CHANGE UP (ref 80–100)
MONOCYTES # BLD AUTO: 0.44 K/UL — SIGNIFICANT CHANGE UP (ref 0–0.9)
MONOCYTES NFR BLD AUTO: 7.4 % — SIGNIFICANT CHANGE UP (ref 2–14)
NEUTROPHILS # BLD AUTO: 4.39 K/UL — SIGNIFICANT CHANGE UP (ref 1.8–7.4)
NEUTROPHILS NFR BLD AUTO: 73.9 % — SIGNIFICANT CHANGE UP (ref 43–77)
NRBC # BLD: 0 /100 WBCS — SIGNIFICANT CHANGE UP (ref 0–0)
PLATELET # BLD AUTO: 270 K/UL — SIGNIFICANT CHANGE UP (ref 150–400)
POTASSIUM SERPL-MCNC: 3.9 MMOL/L — SIGNIFICANT CHANGE UP (ref 3.5–5.3)
POTASSIUM SERPL-SCNC: 3.9 MMOL/L — SIGNIFICANT CHANGE UP (ref 3.5–5.3)
PROT SERPL-MCNC: 7.4 G/DL — SIGNIFICANT CHANGE UP (ref 6–8.3)
PROTHROM AB SERPL-ACNC: 10.6 SEC — SIGNIFICANT CHANGE UP (ref 10–12.9)
RBC # BLD: 4.93 M/UL — SIGNIFICANT CHANGE UP (ref 4.2–5.8)
RBC # FLD: 12.8 % — SIGNIFICANT CHANGE UP (ref 10.3–14.5)
SODIUM SERPL-SCNC: 140 MMOL/L — SIGNIFICANT CHANGE UP (ref 135–145)
WBC # BLD: 5.95 K/UL — SIGNIFICANT CHANGE UP (ref 3.8–10.5)
WBC # FLD AUTO: 5.95 K/UL — SIGNIFICANT CHANGE UP (ref 3.8–10.5)

## 2019-03-28 PROCEDURE — 85730 THROMBOPLASTIN TIME PARTIAL: CPT

## 2019-03-28 PROCEDURE — 80053 COMPREHEN METABOLIC PANEL: CPT

## 2019-03-28 PROCEDURE — 85610 PROTHROMBIN TIME: CPT

## 2019-03-28 PROCEDURE — 99284 EMERGENCY DEPT VISIT MOD MDM: CPT | Mod: 25

## 2019-03-28 PROCEDURE — 96374 THER/PROPH/DIAG INJ IV PUSH: CPT

## 2019-03-28 PROCEDURE — 85027 COMPLETE CBC AUTOMATED: CPT

## 2019-03-28 PROCEDURE — 36415 COLL VENOUS BLD VENIPUNCTURE: CPT

## 2019-03-28 PROCEDURE — 96375 TX/PRO/DX INJ NEW DRUG ADDON: CPT

## 2019-03-28 PROCEDURE — 76870 US EXAM SCROTUM: CPT

## 2019-03-28 PROCEDURE — 76870 US EXAM SCROTUM: CPT | Mod: 26

## 2019-03-28 PROCEDURE — 96361 HYDRATE IV INFUSION ADD-ON: CPT

## 2019-03-28 PROCEDURE — 99285 EMERGENCY DEPT VISIT HI MDM: CPT

## 2019-03-28 RX ORDER — ONDANSETRON 8 MG/1
4 TABLET, FILM COATED ORAL ONCE
Qty: 0 | Refills: 0 | Status: COMPLETED | OUTPATIENT
Start: 2019-03-28 | End: 2019-03-28

## 2019-03-28 RX ORDER — SODIUM CHLORIDE 9 MG/ML
1000 INJECTION INTRAMUSCULAR; INTRAVENOUS; SUBCUTANEOUS ONCE
Qty: 0 | Refills: 0 | Status: COMPLETED | OUTPATIENT
Start: 2019-03-28 | End: 2019-03-28

## 2019-03-28 RX ORDER — MORPHINE SULFATE 50 MG/1
4 CAPSULE, EXTENDED RELEASE ORAL ONCE
Qty: 0 | Refills: 0 | Status: DISCONTINUED | OUTPATIENT
Start: 2019-03-28 | End: 2019-03-28

## 2019-03-28 RX ADMIN — ONDANSETRON 4 MILLIGRAM(S): 8 TABLET, FILM COATED ORAL at 12:20

## 2019-03-28 RX ADMIN — SODIUM CHLORIDE 1000 MILLILITER(S): 9 INJECTION INTRAMUSCULAR; INTRAVENOUS; SUBCUTANEOUS at 13:12

## 2019-03-28 RX ADMIN — MORPHINE SULFATE 4 MILLIGRAM(S): 50 CAPSULE, EXTENDED RELEASE ORAL at 14:45

## 2019-03-28 RX ADMIN — SODIUM CHLORIDE 1000 MILLILITER(S): 9 INJECTION INTRAMUSCULAR; INTRAVENOUS; SUBCUTANEOUS at 14:12

## 2019-03-28 RX ADMIN — MORPHINE SULFATE 4 MILLIGRAM(S): 50 CAPSULE, EXTENDED RELEASE ORAL at 12:20

## 2019-03-28 NOTE — ED PROVIDER NOTE - NONTENDER LOCATION
left upper quadrant/right costovertebral angle/left lower quadrant/left costovertebral angle/right upper quadrant/right lower quadrant

## 2019-03-28 NOTE — ED PROVIDER NOTE - PROGRESS NOTE DETAILS
Had an at length discussion with patient.  Patient wishes to leave at this time.  The patient understands that they are leaving against medical advice despite the risk of missing a potentially serious diagnosis including pelvic or urethral or bladder injury, which may lead to injury, disability and/or death.  I discussed with the patient which tests would need to be performed and what type of monitoring would be necessary for the patient as well.  I was unable to convince patient to stay for further workup.  The patient was given an opportunity to ask any questions as well.   The patient demonstrates understanding of all risks, is awake and alert and demonstrates competance to make medical decisions.  The patient understands that they may return at any time if desired. Discussed the importance of close, prompt medical follow-up

## 2019-03-28 NOTE — ED PROVIDER NOTE - OBJECTIVE STATEMENT
pt c/o testicle and pelvic pain s/p injury. 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 no head inj, ha, neck or back pain, weakness, numbness, cp, sob, abd pain, arm or leg pain.  pmd-  none currently

## 2019-03-28 NOTE — ED ADULT NURSE NOTE - OBJECTIVE STATEMENT
INFORMATION YOUR PROVIDER WANTS YOU TO KNOW    Thank you for choosing Dr. Milad Booth at [facility]. It was a pleasure to care for you today!    Clinic Policy:  Cancellation and No Show: If you must cancel a visit, please give minimally 24 hours notice so we can accommodate other patients that have been waiting to be seen. Do not rely on a reminder call for your appointment as it is a courtesy and not guaranteed. Please write it on your calendar. You are responsible to be at all scheduled appointments. Any appointment cancelled less than 24 hours prior to start time will be considered a “No Show/Late Cancellation”.      You can cancel your appointment by calling our office at [phone] during our normal business hours which are as follows:  Monday-Thursday 8:00 am to 4:30 pm  Friday 8:00 am to 2:00 pm     Messages:  Messages left for Dr. Milad Booth will be returned within 24-48 business hours.     Medication Requests:  We need up to 7 business days notice for refills to be completed. Please contact your preferred pharmacy for all non-narcotic refills. The pharmacy will contact the office for those refills. It is imperative that you plan ahead as we are unable to guarantee your refill by a certain date if this is not followed. If you miss appointments, you may not get a refill. When calling for refills or other concerns, please take into consideration that we are closed for holidays.    No medication changes are made over the phone, if you feel that a medication change is needed, please make an appointment.    Test results:  Any tests ordered by Dr. Booth will be reviewed at your next appointment.    Forms (FMLA/ Disability):  FMLA/disability forms will need an appointment to be completed. Please complete as much as possible on any forms prior to bringing them to your appointment. This includes adding a telephone number where you can be reached during normal  business hours. Please note that if you are requesting disability paperwork, you will have to complete a Functional Capacity Evaluation. This involves an evaluation that lasts approximately 4 hours and you will be responsible to call your insurance company to verify it is a covered expense.    Your opinion matters!  Our desire is to increase your overall state of wellness and improve your quality of life with individualized patient care and innovative interventional modalities.    In the next few weeks, you may receive a [employer] survey regarding your clinic visit with us today. Your responses on this survey help us to maintain and improve the care we provide to you! We look forward to hearing from you!          This is a 37 y/o male received ambulatory AXO&4 c/o falling 5 feet off, c/o testicle pain. Denies any LOC, nausea, vomiting, diarrhea. respiration even unlabored lung field clear bilaterally. will monitor support and safety maintained.

## 2019-03-28 NOTE — ED ADULT NURSE NOTE - CHIEF COMPLAINT QUOTE
"I feel approx 5 feet threw building landing on crawl space"" at work  C/O pain bilat testicals  denies LOC or hitting head

## 2019-03-28 NOTE — ED ADULT TRIAGE NOTE - CHIEF COMPLAINT QUOTE
"I feel approx 5 feet threw building landing on crawl space"" at work  C/O pain bilat testicals  denies LOC or hitting head "I feel approx 5 feet through building landing on crawl space"" at work  C/O pain bilat testicles  denies LOC or hitting head

## 2025-03-25 NOTE — ED ADULT TRIAGE NOTE - CADM TRG TX PRIOR TO ARRIVAL
none What Type Of Note Output Would You Prefer (Optional)?: Standard Output What Is The Reason For Today's Visit?: Annual Full Body Skin Examination with No Concerns What Is The Reason For Today's Visit? (Being Monitored For X): concerning skin lesions on a periodic basis Additional History: Pt noticed one spot behind R ear that popped up a few months ago that he would like checked
